# Patient Record
(demographics unavailable — no encounter records)

---

## 2025-01-07 NOTE — HISTORY OF PRESENT ILLNESS
[Patient reported PAP Smear was normal] : Patient reported PAP Smear was normal [Y] : Positive pregnancy history [TextBox_4] : GYNHX  history of fibroids, no cysts, or STDs [Mammogramdate] : 2-2024 [Papeardate] :  [ColonoscopyDate] : never [LMPDate] : 9-2024 [PGHxTotal] : 2 [FreeTextEntry1] :

## 2025-01-07 NOTE — REVIEW OF SYSTEMS
[Anxiety] : anxiety [Sleep Disturbances] : sleep disturbances [Negative] : Heme/Lymph [FreeTextEntry8] : Vaginal dryness

## 2025-01-07 NOTE — DISCUSSION/SUMMARY
[FreeTextEntry1] : 49 yo p2 annual gyn , perimenopause w irregular menses, hot flashes, vag dryness, sleep , anxiety , sleep disturbances prempro 0.3/1.5 to start Risk of HRT discussed with patient including but not limited to blood clotting risk of cancer and cardiac disease Return to office 3 months for follow-up Patient will start antianxiety medication Vaginal lubricants moisturizers vaginal estrogen and Jackie Camille discussed Hormonal workup Pelvic sonogram Mammogram Pap HPV Patient is having CAT scan for her sinus issues Colonoscopy to schedule RTO 3 months

## 2025-01-07 NOTE — PHYSICAL EXAM
[Chaperone Declined] : Patient declined chaperone [Appropriately responsive] : appropriately responsive [Alert] : alert [No Acute Distress] : no acute distress [No Lymphadenopathy] : no lymphadenopathy [Soft] : soft [Non-tender] : non-tender [Non-distended] : non-distended [No HSM] : No HSM [No Lesions] : no lesions [No Mass] : no mass [Oriented x3] : oriented x3 [Examination Of The Breasts] : a normal appearance [No Discharge] : no discharge [No Masses] : no breast masses were palpable [Labia Majora] : normal [Labia Minora] : normal [Normal] : normal [Uterine Adnexae] : normal [FreeTextEntry6] : wnl

## 2025-06-17 NOTE — HISTORY OF PRESENT ILLNESS
[FreeTextEntry1] : 49 yo  Patient is here for evaluation  pelvic pain for 2 weeks LMP-  9-2024, seh also reports menopausal symptoms and desires management

## 2025-06-17 NOTE — PROCEDURE
[Pelvic Pain] : pelvic pain [Transvaginal Ultrasound] : transvaginal ultrasound [FreeTextEntry4] : wnl

## 2025-06-17 NOTE — DISCUSSION/SUMMARY
[FreeTextEntry1] : 49 yo p2 pelvic pain, menopausal symptoms pelvic sono- wnl doxy 100 bid 1 week prempro 0.3/1.5  hrt discussed , r/b/a discussed risk  adn alternatives of hrt d/w pt  will consider taking
[FreeTextEntry1] : 51 yo p2 pelvic pain, menopausal symptoms pelvic sono- wnl doxy 100 bid 1 week prempro 0.3/1.5  hrt discussed , r/b/a discussed risk  adn alternatives of hrt d/w pt  will consider taking
Patient unable to complete

## 2025-06-17 NOTE — PHYSICAL EXAM
[FreeTextEntry7] : not tender mid and upper abdomen [FreeTextEntry6] : uterine tenderness on exam, mild llq discomfort

## 2025-06-24 NOTE — ASSESSMENT
[FreeTextEntry1] : 50yoF increased risk for CRC (uncle). Hx of HLD. Presents today for abdominal pain.   Dyspepsia, postprandial No alarm signs -Will schedule EGD to assess for gastritis, esophagitis, GERD, H. pylori -Risks vs. benefits discussed -Will reorder previously discussed US.  -Pt to call 1 week after for results  -Start pantoprazole 40mg 30 minutes before breakfast  CRC Screening -Will schedule colonoscopy -Risks vs. benefits discussed -Educated on generic suprep and dulcolax Prep

## 2025-06-24 NOTE — PHYSICAL EXAM
[Alert] : alert [Normal Voice/Communication] : normal voice/communication [Healthy Appearing] : healthy appearing [No Acute Distress] : no acute distress [Sclera] : the sclera and conjunctiva were normal [Hearing Threshold Finger Rub Not Alexander] : hearing was normal [Normal Lips/Gums] : the lips and gums were normal [Oropharynx] : the oropharynx was normal [Normal Appearance] : the appearance of the neck was normal [No Neck Mass] : no neck mass was observed [No Respiratory Distress] : no respiratory distress [No Acc Muscle Use] : no accessory muscle use [Respiration, Rhythm And Depth] : normal respiratory rhythm and effort [Auscultation Breath Sounds / Voice Sounds] : lungs were clear to auscultation bilaterally [Heart Rate And Rhythm] : heart rate was normal and rhythm regular [Normal S1, S2] : normal S1 and S2 [Murmurs] : no murmurs [Bowel Sounds] : normal bowel sounds [Abdomen Tenderness] : non-tender [No Masses] : no abdominal mass palpated [Abdomen Soft] : soft [] : no hepatosplenomegaly [Oriented To Time, Place, And Person] : oriented to person, place, and time

## 2025-06-24 NOTE — HISTORY OF PRESENT ILLNESS
[FreeTextEntry1] : 50yoF increased risk for CRC (uncle). Hx of HLD. Presents today for abdominal pain.   Pt complaining of postprandial dyspepsia.  Patient denies any abdominal pain, vomiting, dysphagia, heart burn, unintentional weight loss, diarrhea, constipation, melena, hematochezia.    Pt came 2 years ago, but never had her US or EGD/colonoscopy performed.